# Patient Record
Sex: FEMALE
[De-identification: names, ages, dates, MRNs, and addresses within clinical notes are randomized per-mention and may not be internally consistent; named-entity substitution may affect disease eponyms.]

---

## 2021-01-29 PROBLEM — Z00.00 ENCOUNTER FOR PREVENTIVE HEALTH EXAMINATION: Status: ACTIVE | Noted: 2021-01-29

## 2021-01-30 ENCOUNTER — LABORATORY RESULT (OUTPATIENT)
Age: 26
End: 2021-01-30

## 2021-01-30 ENCOUNTER — APPOINTMENT (OUTPATIENT)
Dept: OBGYN | Facility: CLINIC | Age: 26
End: 2021-01-30
Payer: MEDICARE

## 2021-01-30 VITALS
DIASTOLIC BLOOD PRESSURE: 60 MMHG | HEIGHT: 60 IN | BODY MASS INDEX: 21.01 KG/M2 | TEMPERATURE: 97.3 F | SYSTOLIC BLOOD PRESSURE: 104 MMHG | WEIGHT: 107 LBS

## 2021-01-30 DIAGNOSIS — Z82.49 FAMILY HISTORY OF ISCHEMIC HEART DISEASE AND OTHER DISEASES OF THE CIRCULATORY SYSTEM: ICD-10-CM

## 2021-01-30 DIAGNOSIS — Z01.419 ENCOUNTER FOR GYNECOLOGICAL EXAMINATION (GENERAL) (ROUTINE) W/OUT ABNORMAL FINDINGS: ICD-10-CM

## 2021-01-30 DIAGNOSIS — Z83.3 FAMILY HISTORY OF DIABETES MELLITUS: ICD-10-CM

## 2021-01-30 PROCEDURE — 99072 ADDL SUPL MATRL&STAF TM PHE: CPT

## 2021-01-30 PROCEDURE — 36415 COLL VENOUS BLD VENIPUNCTURE: CPT

## 2021-01-30 PROCEDURE — 99385 PREV VISIT NEW AGE 18-39: CPT

## 2021-01-30 PROCEDURE — 99203 OFFICE O/P NEW LOW 30 MIN: CPT | Mod: 25

## 2021-01-30 RX ORDER — NORGESTIMATE AND ETHINYL ESTRADIOL 7DAYSX3 LO
0.18/0.215/0.25 KIT ORAL DAILY
Qty: 3 | Refills: 3 | Status: ACTIVE | COMMUNITY
Start: 2021-01-30 | End: 1900-01-01

## 2021-01-30 RX ORDER — MEDROXYPROGESTERONE ACETATE 10 MG/1
10 TABLET ORAL DAILY
Qty: 10 | Refills: 0 | Status: ACTIVE | COMMUNITY
Start: 2021-01-30 | End: 1900-01-01

## 2021-01-30 NOTE — HISTORY OF PRESENT ILLNESS
[Y] : Patient is sexually active [N] : Patient denies prior pregnancies [Menarche Age: ____] : age at menarche was [unfilled] [Currently Active] : currently active [Men] : men [Vaginal] : vaginal [No] : No [TextBox_4] : 25-year-old patient presents for annual gynecological exam with complaints of irregular periods\par \par LMP 3 months ago\par \par Patient reports a drastic change in exercise in March with Covid as she stopped working as a  up until September when she went back to work-she is a longtime fitness and dancer and has never been out of exercise this long in the past\par \par We reviewed the differentials for missed menses and hormonal lab work was sent-patient denies any hot flushes feels well otherwise and denies pelvic pain\par \par Patient is interested in restarting oral contraceptive for contraception stating she tolerated Ortho Tri-[employer]en Lo well in the past\par \par I advised she induce a withdrawal menses with the Provera challenge prior to starting oral contraceptive. [ChlamydiaDate] : 4/18/2017 [TextBox_68] : neg  [LMPDate] : 10/11/2020  [TextBox_9] : 18  [TextBox_6] : 10/11/2020 [FreeTextEntry1] : 10/11/2020

## 2021-01-30 NOTE — PLAN
[FreeTextEntry1] : fu in 1 year\par \par Instructions and precautions for Provera and oral contraceptive use was reviewed with the patient\par \par Warning signs and precautions for amenorrhea reviewed and for more immediate attention outlined

## 2021-01-30 NOTE — COUNSELING
[Vitamins/Supplements] : vitamins/supplements [Nutrition/ Exercise/ Weight Management] : nutrition, exercise, weight management [Sunscreen] : sunscreen [Breast Self Exam] : breast self exam [Contraception/ Emergency Contraception/ Safe Sexual Practices] : contraception, emergency contraception, safe sexual practices [STD (testing, results, tx)] : STD (testing, results, tx)

## 2021-02-01 ENCOUNTER — TRANSCRIPTION ENCOUNTER (OUTPATIENT)
Age: 26
End: 2021-02-01

## 2021-02-03 ENCOUNTER — NON-APPOINTMENT (OUTPATIENT)
Age: 26
End: 2021-02-03

## 2021-02-04 ENCOUNTER — NON-APPOINTMENT (OUTPATIENT)
Age: 26
End: 2021-02-04

## 2021-02-06 ENCOUNTER — APPOINTMENT (OUTPATIENT)
Dept: OBGYN | Facility: CLINIC | Age: 26
End: 2021-02-06
Payer: COMMERCIAL

## 2021-02-06 ENCOUNTER — ASOB RESULT (OUTPATIENT)
Age: 26
End: 2021-02-06

## 2021-02-06 VITALS
DIASTOLIC BLOOD PRESSURE: 70 MMHG | BODY MASS INDEX: 21.01 KG/M2 | TEMPERATURE: 97.2 F | SYSTOLIC BLOOD PRESSURE: 110 MMHG | WEIGHT: 107 LBS | HEIGHT: 60 IN

## 2021-02-06 DIAGNOSIS — R79.89 OTHER SPECIFIED ABNORMAL FINDINGS OF BLOOD CHEMISTRY: ICD-10-CM

## 2021-02-06 DIAGNOSIS — R45.86 EMOTIONAL LABILITY: ICD-10-CM

## 2021-02-06 DIAGNOSIS — N91.1 SECONDARY AMENORRHEA: ICD-10-CM

## 2021-02-06 LAB
ANDROST SERPL-MCNC: 178 NG/DL
BASOPHILS # BLD AUTO: 0.02 K/UL
BASOPHILS NFR BLD AUTO: 0.3 %
C TRACH RRNA SPEC QL NAA+PROBE: NOT DETECTED
DHEA-S SERPL-MCNC: 402 UG/DL
EOSINOPHIL # BLD AUTO: 0.07 K/UL
EOSINOPHIL NFR BLD AUTO: 0.9 %
ESTRADIOL SERPL-MCNC: 49 PG/ML
FSH SERPL-MCNC: 3.9 IU/L
HCG SERPL-MCNC: <1 MIU/ML
HCT VFR BLD CALC: 39.8 %
HGB BLD-MCNC: 12.6 G/DL
IMM GRANULOCYTES NFR BLD AUTO: 0.3 %
LH SERPL-ACNC: 13.2 IU/L
LYMPHOCYTES # BLD AUTO: 2 K/UL
LYMPHOCYTES NFR BLD AUTO: 25.7 %
MAN DIFF?: NORMAL
MCHC RBC-ENTMCNC: 29 PG
MCHC RBC-ENTMCNC: 31.7 GM/DL
MCV RBC AUTO: 91.5 FL
MONOCYTES # BLD AUTO: 1.08 K/UL
MONOCYTES NFR BLD AUTO: 13.9 %
N GONORRHOEA RRNA SPEC QL NAA+PROBE: NOT DETECTED
NEUTROPHILS # BLD AUTO: 4.58 K/UL
NEUTROPHILS NFR BLD AUTO: 58.9 %
PLATELET # BLD AUTO: 274 K/UL
PROLACTIN SERPL-MCNC: 18 NG/ML
RBC # BLD: 4.35 M/UL
RBC # FLD: 11.7 %
SOURCE TP AMPLIFICATION: NORMAL
TSH SERPL-ACNC: 0.01 UIU/ML
WBC # FLD AUTO: 7.77 K/UL

## 2021-02-06 PROCEDURE — 99215 OFFICE O/P EST HI 40 MIN: CPT | Mod: 25

## 2021-02-06 PROCEDURE — 76830 TRANSVAGINAL US NON-OB: CPT

## 2021-02-06 PROCEDURE — 99072 ADDL SUPL MATRL&STAF TM PHE: CPT

## 2021-02-06 NOTE — HISTORY OF PRESENT ILLNESS
[Y] : Patient is sexually active [N] : Patient denies prior pregnancies [Menarche Age: ____] : age at menarche was [unfilled] [Currently Active] : currently active [Men] : men [Vaginal] : vaginal [No] : No [TextBox_4] : 25-year-old patient presents with her mother to discuss test results and plan\par \par Patient is tearful and emotional about her abnormal thyroid levels and elevated testosterone\par \par Patient states she is not feeling like herself with a change in energy and mood but denies ideation or thoughts of harming herself or others-she just wants to get back to feeling like herself\par \par She already spoke to her primary care physician about her abnormal T3 and T4 this morning who gave her to endocrinologist to refer to-I confirmed with endocrinology evaluation at this time\par \par We spoke about call parameters and warning signs for more immediate ER attention\par \par Patient is interested in continuing on birth control to help regulate her menstrual cycle as she tolerated oral contraceptives well in the past does better on oral contraceptive-\par \par Sonogram reviewed noting multifollicular bilateral ovaries-PCOS was discussed-encouraged her to call for any absent withdrawal menses and follow-up discussion with endocrinologist\par Extended counseling with patient in the room with both patient and her mother 49 minutes face-to-face counseling [PapSmeardate] : 1/30/2021  [TextBox_31] : PENDING  [ChlamydiaDate] : 1/30/2021 [TextBox_68] : NEG  [LMPDate] : 10/2020  [TextBox_6] : 10/2020  [TextBox_9] : 18  [FreeTextEntry1] : 10/2020

## 2021-02-08 LAB
TESTOST BND SERPL-MCNC: 4.6 PG/ML
TESTOST SERPL-MCNC: 54.2 NG/DL

## 2021-02-11 LAB — CYTOLOGY CVX/VAG DOC THIN PREP: NORMAL

## 2022-02-07 ENCOUNTER — LABORATORY RESULT (OUTPATIENT)
Age: 27
End: 2022-02-07

## 2022-02-07 ENCOUNTER — APPOINTMENT (OUTPATIENT)
Dept: OBGYN | Facility: CLINIC | Age: 27
End: 2022-02-07
Payer: SELF-PAY

## 2022-02-07 VITALS
DIASTOLIC BLOOD PRESSURE: 80 MMHG | WEIGHT: 106 LBS | BODY MASS INDEX: 20.81 KG/M2 | HEIGHT: 60 IN | SYSTOLIC BLOOD PRESSURE: 120 MMHG

## 2022-02-07 DIAGNOSIS — Z11.3 ENCOUNTER FOR SCREENING FOR INFECTIONS WITH A PREDOMINANTLY SEXUAL MODE OF TRANSMISSION: ICD-10-CM

## 2022-02-07 DIAGNOSIS — Z12.4 ENCOUNTER FOR SCREENING FOR MALIGNANT NEOPLASM OF CERVIX: ICD-10-CM

## 2022-02-07 DIAGNOSIS — E28.2 POLYCYSTIC OVARIAN SYNDROME: ICD-10-CM

## 2022-02-07 LAB
HCG UR QL: NEGATIVE
QUALITY CONTROL: YES

## 2022-02-07 PROCEDURE — 99395 PREV VISIT EST AGE 18-39: CPT

## 2022-02-07 PROCEDURE — 81025 URINE PREGNANCY TEST: CPT

## 2022-02-07 RX ORDER — NORETHINDRONE ACETATE AND ETHINYL ESTRADIOL AND FERROUS FUMARATE 1MG-20(21)
1-20 KIT ORAL
Qty: 1 | Refills: 4 | Status: ACTIVE | COMMUNITY
Start: 2022-02-07 | End: 1900-01-01

## 2022-02-07 NOTE — COUNSELING
[Nutrition/ Exercise/ Weight Management] : nutrition, exercise, weight management [Vitamins/Supplements] : vitamins/supplements [Sunscreen] : sunscreen [Breast Self Exam] : breast self exam [Bladder Hygiene] : bladder hygiene [STD (testing, results, tx)] : STD (testing, results, tx) [Vaccines] : vaccines

## 2022-02-07 NOTE — HISTORY OF PRESENT ILLNESS
[FreeTextEntry1] : 25 YO FEMALE \par HERE FOR ANNUAL EXAM, WANTS TO DOUBLE CHECK PCOS WITH US AND BLOOD WORK \par HER LAST ANNUAL EXAM WAS 2021\par LMP  2022 IRREGULAR LAST TIME WAS 10/2021 \par GARDASIL :  1/3\par SEXUALLY ACTIVE:yes male\par LENGTH OF TIME IN RELATIONSHIP: 1 years exclusive \par MEDICAL HISTORY INCLUDES:WAS HYPERTHYROID (PER PT IT FIXED), PCOS F/U BY ENDO \par FAMILY HISTORY IS SIGNIFICANT FOR: PGF COLON CA\par LAST VISIT WITH PRIMARY DOCTOR:  \par NUTRITIONAL INFO: overall well balanced,ca rich food: 2 daily, WEIGHT BEARING Exercise

## 2022-02-07 NOTE — DISCUSSION/SUMMARY
[FreeTextEntry1] : 27 YO FEMALE,HERE FOR ANNUAL EXAM\par PT REQUESTED TESTING STD AND PCOS \par PT IS AWARE SHE WILL GET A BILL FROM LAB\par ALL QUESTIONS ANSWERED\par DISCUSSED PRECAUTIONS AGAINST COVID19, INCLUDING MASK WEARING, SOCIAL DISTANCING AND HAND WASHING.\par VACCINATED X1 J&J

## 2022-02-07 NOTE — PHYSICAL EXAM

## 2022-02-09 LAB
C TRACH RRNA SPEC QL NAA+PROBE: NOT DETECTED
HBV SURFACE AB SER QL: REACTIVE
HBV SURFACE AG SER QL: NONREACTIVE
HCV AB SER QL: NONREACTIVE
HCV S/CO RATIO: 0.18 S/CO
HIV1+2 AB SPEC QL IA.RAPID: NONREACTIVE
N GONORRHOEA RRNA SPEC QL NAA+PROBE: NOT DETECTED
SOURCE TP AMPLIFICATION: NORMAL
T PALLIDUM AB SER QL IA: NEGATIVE

## 2022-02-14 LAB — CYTOLOGY CVX/VAG DOC THIN PREP: NORMAL

## 2022-02-17 ENCOUNTER — NON-APPOINTMENT (OUTPATIENT)
Age: 27
End: 2022-02-17

## 2022-11-04 ENCOUNTER — RX RENEWAL (OUTPATIENT)
Age: 27
End: 2022-11-04

## 2022-11-04 RX ORDER — NORETHINDRONE ACETATE AND ETHINYL ESTRADIOL 1MG-20(21)
1-20 KIT ORAL
Qty: 28 | Refills: 0 | Status: ACTIVE | COMMUNITY
Start: 2022-11-04 | End: 1900-01-01